# Patient Record
Sex: FEMALE | Race: WHITE | NOT HISPANIC OR LATINO | Employment: UNEMPLOYED | URBAN - NONMETROPOLITAN AREA
[De-identification: names, ages, dates, MRNs, and addresses within clinical notes are randomized per-mention and may not be internally consistent; named-entity substitution may affect disease eponyms.]

---

## 2020-06-15 ENCOUNTER — PROCEDURE VISIT (OUTPATIENT)
Dept: OTOLARYNGOLOGY | Facility: CLINIC | Age: 60
End: 2020-06-15

## 2020-06-15 ENCOUNTER — OFFICE VISIT (OUTPATIENT)
Dept: OTOLARYNGOLOGY | Facility: CLINIC | Age: 60
End: 2020-06-15

## 2020-06-15 VITALS
TEMPERATURE: 97 F | HEART RATE: 60 BPM | SYSTOLIC BLOOD PRESSURE: 141 MMHG | HEIGHT: 67 IN | DIASTOLIC BLOOD PRESSURE: 82 MMHG | WEIGHT: 170.4 LBS | BODY MASS INDEX: 26.74 KG/M2

## 2020-06-15 DIAGNOSIS — H90.3 SENSORINEURAL HEARING LOSS (SNHL) OF BOTH EARS: Primary | ICD-10-CM

## 2020-06-15 DIAGNOSIS — IMO0001 ASYMMETRICAL HEARING LOSS OF RIGHT EAR: Primary | ICD-10-CM

## 2020-06-15 DIAGNOSIS — R42 VERTIGO: ICD-10-CM

## 2020-06-15 DIAGNOSIS — H93.11 TINNITUS OF RIGHT EAR: ICD-10-CM

## 2020-06-15 PROCEDURE — 99203 OFFICE O/P NEW LOW 30 MIN: CPT | Performed by: PHYSICIAN ASSISTANT

## 2020-06-15 RX ORDER — LEVOTHYROXINE SODIUM 0.1 MG/1
100 TABLET ORAL DAILY
COMMUNITY
Start: 2020-03-28

## 2020-06-15 RX ORDER — PREDNISONE 10 MG/1
TABLET ORAL
Qty: 30 TABLET | Refills: 0 | Status: SHIPPED | OUTPATIENT
Start: 2020-06-15

## 2020-06-15 RX ORDER — RABEPRAZOLE SODIUM 20 MG/1
20 TABLET, DELAYED RELEASE ORAL DAILY
COMMUNITY
Start: 2017-04-11

## 2020-06-15 RX ORDER — ALENDRONATE SODIUM 70 MG/1
70 TABLET ORAL WEEKLY
COMMUNITY
Start: 2020-04-23

## 2020-06-15 NOTE — PATIENT INSTRUCTIONS
Will get MRI of IAC's to rule out other retrocochlear diseases, will treat with steroids as it could be an atypical meniere's flair up. Recheck in 6 months.    MENIERE’S DISEASE    Description:  Meniere’s disease is a disorder of the inner ear.  The disease appears to be related to a failure of the mechanism regulating the production, circulation, and/or absorption of endolymph.  The cause of Meniere’s disease is unknown.  Proposed theories of cochlear dysfunction in patients with Meniere’s disease include mechanical, metabolic, and/or biochemical alterations.  Clinically, the classic symptoms of Meniere’s disease are recurrent episodes of vertigo, fluctuating sensorineural hearing loss, and roaring tinnitus.    Histopathologic observations on temporal bones from individuals with Meniere’s disease have consistently shown distention of the endolymphatic system.  Dilatation of the membranous labyrinth may be accompanied by herniations, ruptures, fistulas, and/or total collapse.  The organ of Corti  appears normal in some specimens and abnormal in others (Kimura et al., 1976).  In general, morphologic changes in the organ of Corti have been difficult to ascribe exclusively to Meniere’s disease because similar changes may be found in non-Meniere’s specimens, such as individuals with presbyacusis (Kimura et al., 1976).    Patient Characteristics:  The initial onset of symptoms is between 40 and 60 years of age in about 50% of individuals.  The disease is rate in children.  The hearing loss is unilateral in approximately 80% of individuals.  A hereditary predisposition for Meniere’s disease may be indicated by occasional occurrences of episodic vertigo and hearing loss in families (Pratt, 1965).  A sex ration for Meniere’s disease is disputed.    Clinical Course:  The onset of Meniere’s disease is typically characterized by a sudden episode of vertigo accompanied by nausea and vomiting.  The onset of vertigo may be  preceded by feelings of pressure or fullness in the ear.  Briefly before or during the vertiginous episode, a loud roaring or ringing tinnitus may become apparent.  Hearing sensitivity in the affected ear may decrease.  However, information about hearing sensitivity during the vertiginous episode may be difficult to obtain because the symptoms of vertigo and loud, roaring tinnitus may be relatively more dramatic.    Following the acute episode, the vertigo may cease or subside into a sensation of unsteadiness.  Feelings of unsteadiness may last for a period of days.  The attack usually involves no neurologic symptoms other than those referable to the end organ.  At the end of the attack, hearing sensitivity may improve.  Tinnitus may lessen.    The initial attack of vertigo is characteristically followed by a period of remission.  During this time, the patient may notice fluctuation in his hearing ability, in the tinnitus, and in the feelings of pressure or fullness within the ear.  Subsequently, the above symptoms may be interrupted or exacerbated by another episode of spontaneous vertigo.  The characteristic frequency of vertiginous attacks is variable among patients.      Gradually, a longer period of spontaneous or treatment-induced remission occurs.  During remission, no symptoms may be noted other than a loss of hearing in the involved ear. The clinical course is subsequently characterized by active periods of variable length interspersed with remission periods of variable length.  Over time, the symptoms of vertigo, nausea, and vomiting tend to become less severe.  In contrast, the hearing loss and tinnitus may become progressively worse (Marissa and Arnulfo, 1976).    Treatment may involve drug therapy, diet modification, and/or surgery in patients with incapacitating and unsuccessfully treated vertiginous attacks.    Site of Disorder:      [place].    General Audiologic Pattern:  Pure tone sensitivity  results characteristically show a unilateral, fluctuating sensorineural hearing loss.  During the initial sates of Meniere’s disease, the degree of loss may vary.  However, over a period of years, the degree of loss usually progresses to a moderate to severe, permanent impairment.  On rare occasions, a total loss of hearing may occur.    The audiometric configuration may vary with the time course of the disease.  In the initial stages of Meniere’s disease, the audiometric contour may be rising, with greater loss in the low frequency region than in the mid and high frequency regions.   Subsequently, the audiometric configuration may change to a relatively flat contour with approximately the same degree of sensitivity loss at all frequencies.  During a later stage of Meniere’s disease, the audiogram may present a downwardly sloping configuration with greater loss in the high frequency region than in the low frequency region.    Maximum speech intelligibility scores are usually reduced in a manner consistent with the degree and configuration of sensitivity loss.    Impedance results characteristically show normal, type A, tympanograms and normal static compliance measures.  Acoustic reflexes are characteristically present at normal HLs and reduced sensation levels.  No reflex decay is observed at 500 Hz or 1000 Hz.    In some patients with Meniere’s disease, the loudness discomfort level or the intensity level where sounds are first reported as annoying may occur at a reduced hearing level relative to the normal ear.  Patients may report intolerance to loud sounds.  Consequently, auditory tests at loud intensity levels may not e obtainable.  In addition, patients may complain of diplacusis or a difference of pitch sensation in the involved ear relative to the normal ear.

## 2020-06-16 PROBLEM — H93.11 TINNITUS OF RIGHT EAR: Status: ACTIVE | Noted: 2020-06-16

## 2020-06-16 PROBLEM — R42 VERTIGO: Status: ACTIVE | Noted: 2020-06-16

## 2020-06-16 PROBLEM — IMO0001 ASYMMETRICAL HEARING LOSS OF RIGHT EAR: Status: ACTIVE | Noted: 2020-06-16

## 2020-06-25 ENCOUNTER — TELEPHONE (OUTPATIENT)
Dept: OTOLARYNGOLOGY | Facility: CLINIC | Age: 60
End: 2020-06-25

## 2020-06-25 NOTE — TELEPHONE ENCOUNTER
----- Message from OFELIA Jurdao sent at 6/25/2020  8:42 AM CDT -----  Please call the patient regarding her normal result.

## 2020-07-27 ENCOUNTER — OFFICE VISIT (OUTPATIENT)
Dept: OTOLARYNGOLOGY | Facility: CLINIC | Age: 60
End: 2020-07-27

## 2020-07-27 VITALS
HEART RATE: 66 BPM | RESPIRATION RATE: 16 BRPM | DIASTOLIC BLOOD PRESSURE: 87 MMHG | SYSTOLIC BLOOD PRESSURE: 133 MMHG | HEIGHT: 67 IN | BODY MASS INDEX: 26.21 KG/M2 | WEIGHT: 167 LBS | TEMPERATURE: 97.7 F

## 2020-07-27 DIAGNOSIS — IMO0001 ASYMMETRICAL HEARING LOSS OF RIGHT EAR: Primary | ICD-10-CM

## 2020-07-27 DIAGNOSIS — H81.01 MENIERE DISEASE, RIGHT: ICD-10-CM

## 2020-07-27 DIAGNOSIS — H93.11 TINNITUS OF RIGHT EAR: ICD-10-CM

## 2020-07-27 DIAGNOSIS — R42 VERTIGO: ICD-10-CM

## 2020-07-27 PROCEDURE — 99214 OFFICE O/P EST MOD 30 MIN: CPT | Performed by: PHYSICIAN ASSISTANT

## 2020-07-27 NOTE — PROGRESS NOTES
OFELIA Jurado     Chief Complaint   Patient presents with   • Ear Problem        HISTORY OF PRESENT ILLNESS:     Yesica Patel is a  60 y.o.  female who presents for follow-up evaluation of complaints of roaring tinnitus. The symptoms are localized to the right ear. The patient has had continued symptoms. The symptoms have been relatively constant for the last several months. The symptoms are aggravated by  no identifiable factors. The symptoms are improved by no identifiable factors.    The patient reports being evaluated at the [facility] in the past and was diagnosed with Meniere's disease. She reports that she has never had an MRI before of the head. The roaring came on suddenly in the right ear in February. The patient denies vertigo symptoms at this time and states she doesn't feel like her hearing has changed.                 Review of Systems   Constitutional: Negative for activity change, appetite change, chills, diaphoresis, fatigue, fever and unexpected weight change.   HENT: Positive for hearing loss and tinnitus. Negative for congestion, dental problem, drooling, ear discharge, ear pain, facial swelling, mouth sores, nosebleeds, postnasal drip, rhinorrhea, sinus pressure, sneezing, sore throat, trouble swallowing and voice change.    Eyes: Negative.    Respiratory: Negative.    Cardiovascular: Negative.    Gastrointestinal: Negative.    Endocrine: Negative.    Skin: Negative.    Allergic/Immunologic: Negative for environmental allergies, food allergies and immunocompromised state.   Neurological: Negative for dizziness.   Hematological: Negative.    Psychiatric/Behavioral: Negative.    :    Past History:  Past Medical History:   Diagnosis Date   • Breast cancer (CMS/HCC) 2010   • Disease of thyroid gland    • Osteoporosis      Past Surgical History:   Procedure Laterality Date   • BREAST SURGERY     • HYSTERECTOMY     • LAPAROSCOPIC TUBAL LIGATION       Family History   Problem Relation Age  of Onset   • Cancer Paternal Grandfather         Breast     Social History     Tobacco Use   • Smoking status: Never Smoker   • Smokeless tobacco: Never Used   Substance Use Topics   • Alcohol use: Never     Frequency: Never   • Drug use: Never     Outpatient Medications Marked as Taking for the 7/27/20 encounter (Office Visit) with Ino Hair PA   Medication Sig Dispense Refill   • alendronate (FOSAMAX) 70 MG tablet Take 70 mg by mouth 1 (One) Time Per Week.     • levothyroxine (SYNTHROID, LEVOTHROID) 100 MCG tablet Take 100 mcg by mouth Daily.     • RABEprazole (ACIPHEX) 20 MG EC tablet Take 20 mg by mouth Daily.       Allergies:  Patient has no known allergies.          Vital Signs:   Vitals:    07/27/20 1345   BP: 133/87   Pulse: 66   Resp: 16   Temp: 97.7 °F (36.5 °C)         EXAMINATION:   CONSTITUTIONAL: well nourished, alert, oriented, in no acute distress     COMMUNICATION AND VOICE: able to communicate normally, normal voice quality    HEAD: normocephalic, no lesions, atraumatic, no tenderness, no masses     FACE: appearance normal, no lesions, no tenderness, no deformities, facial motion symmetric    EYES: ocular motility normal, eyelids normal, orbits normal, no proptosis, conjunctiva normal , pupils equal, round     EARS:  Hearing: response to conversational voice normal bilaterally   External Ears: auricles without lesions    NOSE:  External Nose: structure normal, no tenderness on palpation, no nasal discharge, no lesions, no evidence of trauma, nostrils patent     ORAL:  Lips: upper and lower lips without lesion     NECK: neck appearance normal    CHEST/RESPIRATORY: respiratory effort normal, normal breath sounds     CARDIOVASCULAR: rate and rhythm normal, extremities without cyanosis or edema      NEUROLOGIC/PSYCHIATRIC: oriented to time, place and person, mood normal, affect appropriate, CN II-XII intact grossly    RESULTS REVIEW:    I have reviewed the patients old records in the  chart.  Old audiologic testing was reviewed.       Assessment    Diagnosis Plan   1. Asymmetrical hearing loss of right ear  Comprehensive Hearing Test   2. Tinnitus of right ear  Comprehensive Hearing Test   3. Vertigo  Comprehensive Hearing Test   4. Meniere disease, right  Comprehensive Hearing Test       Plan    Patient Instructions   Continue treatment as directed, would recommend low salt diet and avoiding processed foods. If symptoms flair-up advised to call for medrol pack, zofran, and robinul.     Would recommend considering a hearing aid evaluation.    Follow-up in one year with audiogram.    500 Milligrams or 22 Mill equivalent Sodium Diet    All foods on this diet should be cooked and served without the addition of salt or other sodium compounds.    This diet meets the 1989 Recommended Daily Dietary Allowance when the types of food and amounts suggested are included daily:    1. 2 or more cups of milk or substitute for adults  2. 2 or more servings of meat, fish, poultry, eggs, or cheese  3. 4 or more servings of bread and/or cereal (whole grain or  enriched)  4. 4 or more servings of vegetables and fruits (include citrus daily and a dark green or deep yellow vegetable every other day)    Foods  Allowed     Not allowed    Beverages Milk, 2 cups only, including that used in   Buttermilk, milk shakes, malted milk, commercial   cooking.  Whole, 2% skim, evaporated  eggnog. Instant cocoa mixes, carbonated beverages,    milk, reconstituted dried milk.  Other  prepared beverage mixes, including fruit flavoring    beverages as desired.  Cocoa, coffee  powder, [place] process cocoa, fountain beverages.    substitutes, decaffeinated coffee, tea,     some carbonated (consult your dietician.)    Bread  Low sodium break, unsalted crackers,  All bread or bakery goods prepared with baking    unsalted krysta toast.    powder, baking soda, or salt, salted crackers, pretzels,          self-rising flour, prepared mixes,  self-rising meal.    Cereals  Cooked cereal prepared without salt,  Cereals containing more than 25mg sodium.    dry or instant cereals containing 25mg of    sodium or less. (Ask dietician for list of    brand names).    Desserts  As desired. Fruits, gelatin desserts made  Blackstrap molasses.    with low sodium gelatin powder, unsalted    custard and pudding made from milk and    egg allowance, fruit plea made with unsalted    crust, marshmallows, hard candy, jelly beans.    For other desserts, ask dietitian for brand names.    Egg  One only, prepared any way without salt.    Fat  As desired. Unsalted butter, unsalted margarine, Salted butter, salted margarine, segal, pork, ham    hydrogenated fat, vegetable oil, low sodium  hock, salted commercial salad dressing or mayonnaise.    Dietetic salad dressing, cream (limit 2tbs.)        Fruit and All fresh, frozen or canned fruit unless listed  Canned, dried, or frozen fruit, which have salt or sodium  Fruit Juices under “Not Allowed”.    benzoate added, crystallized or glazed fruit, maraschino          cherries, tomato, V-8 or other vegetable juices unless low          sodium.                    Meat, Fish, 4 ounces only, prepared any way without  Canned, salted or smoked meat (segal, chipped or  Poultry,Cheese salt, beef, lamb, pork, veal, chicken, turkey,  corned beef, frankfurters, ham, kosher meats, luncheon    fresh or unsalted frozen fish, low sodium  meats, salt pork, sausage, smoked tongue, most frozen    tuna or salmon, unsalted cottage cheese,  fish,(anchovies, salt cod, herring, sardines, etc.) canned    5 tsp peanut butter may be substituted for  tuna or salmon unless low sodium, shellfish (clams,    one ounce of meat.    Crab, lobster, scallops, shrimp) kidney, cheese except          unsalted cottage cheese and low sodium cheese.    Potato or Prepared any way without salt. White or  Potato chips or other snack chips, instant potatoes,  Potato Sub sweet  potatoes, yams, macaroni, noodles,  convenience potatoes, convenience rice or noodle    spaghetti, rice     products    Soup  As desired. Salt-free broth or cream   Soup made with salt or sodium compounds, canned    soup made with milk allowance and  soup unless low bouillon cubes, dried soup.    allowed vegetable, low sodium canned    soup.    Vegetables Vegetables prepared any way without  Fresh, frozen or canned artichokes, beets, best greens,    salt.  All fresh frozen or low sodium  carrots, celery, hominy, kale, mustard greens, mixed    canned vegetables unless listed under  vegetables, parsley, sauerkraut, spinach, turnips (white).    “Not allowed”.     frozen lima beans, peas, dried split peas.  All regular          canned vegetables.  All tomato juice or vegetable juice.    Miscellaneous As desired.  All spices, basil, bay leaves,  Salt, baking soda, baking powder, celery seed, celery salt,    chili powder, cinnamon, cloves, cocoa,  onion salt, mixed seasoned salt, mono-sodium glutamate,    emerson, coconut, dill, garlic, garlic powder  catsup, chili sauce, barbecue sauce, prepared mustard, segal    uday, mace, dry mustard powder, oregano  bits, prepared horseradish, soy sauce, olives, [facility]    paprika, pepper, poultry seasoning, naldo,  sauce, steak sauce, Kitchen Bouquet, meat extracts, gravies,  thyme, turmeric, emanuel, poppy and sesame tenderizers, pickles, relish, salted nuts, cooking kiara     seeds, lemon juice, vinegar, flavoring extracts, containing salt, seasoned cooking mixes.    cream of tartar, yeast, unsalted nuts, unsalted    popcorn.       Orders Placed This Encounter   Procedures   • Comprehensive Hearing Test          Return in about 1 year (around 7/27/2021) for Recheck ears with audiogram.    OFELIA Jurado  07/27/20  14:01

## 2020-07-27 NOTE — PATIENT INSTRUCTIONS
Continue treatment as directed, would recommend low salt diet and avoiding processed foods. If symptoms flair-up advised to call for medrol pack, zofran, and robinul.     Would recommend considering a hearing aid evaluation.    Follow-up in one year with audiogram.    500 Milligrams or 22 Mill equivalent Sodium Diet    All foods on this diet should be cooked and served without the addition of salt or other sodium compounds.    This diet meets the 1989 Recommended Daily Dietary Allowance when the types of food and amounts suggested are included daily:    1. 2 or more cups of milk or substitute for adults  2. 2 or more servings of meat, fish, poultry, eggs, or cheese  3. 4 or more servings of bread and/or cereal (whole grain or  enriched)  4. 4 or more servings of vegetables and fruits (include citrus daily and a dark green or deep yellow vegetable every other day)    Foods  Allowed     Not allowed    Beverages Milk, 2 cups only, including that used in   Buttermilk, milk shakes, malted milk, commercial   cooking.  Whole, 2% skim, evaporated  eggnog. Instant cocoa mixes, carbonated beverages,    milk, reconstituted dried milk.  Other  prepared beverage mixes, including fruit flavoring    beverages as desired.  Cocoa, coffee  powder, [place] process cocoa, fountain beverages.    substitutes, decaffeinated coffee, tea,     some carbonated (consult your dietician.)    Bread  Low sodium break, unsalted crackers,  All bread or bakery goods prepared with baking    unsalted krysta toast.    powder, baking soda, or salt, salted crackers, pretzels,          self-rising flour, prepared mixes, self-rising meal.    Cereals  Cooked cereal prepared without salt,  Cereals containing more than 25mg sodium.    dry or instant cereals containing 25mg of    sodium or less. (Ask dietician for list of    brand names).    Desserts  As desired. Fruits, gelatin desserts made  Blackstrap molasses.    with low sodium gelatin powder,  unsalted    custard and pudding made from milk and    egg allowance, fruit plea made with unsalted    crust, marshmallows, hard candy, jelly beans.    For other desserts, ask dietitian for brand names.    Egg  One only, prepared any way without salt.    Fat  As desired. Unsalted butter, unsalted margarine, Salted butter, salted margarine, segal, pork, ham    hydrogenated fat, vegetable oil, low sodium  hock, salted commercial salad dressing or mayonnaise.    Dietetic salad dressing, cream (limit 2tbs.)        Fruit and All fresh, frozen or canned fruit unless listed  Canned, dried, or frozen fruit, which have salt or sodium  Fruit Juices under “Not Allowed”.    benzoate added, crystallized or glazed fruit, maraschino          cherries, tomato, V-8 or other vegetable juices unless low          sodium.                    Meat, Fish, 4 ounces only, prepared any way without  Canned, salted or smoked meat (segal, chipped or  Poultry,Cheese salt, beef, lamb, pork, veal, chicken, turkey,  corned beef, frankfurters, ham, kosher meats, luncheon    fresh or unsalted frozen fish, low sodium  meats, salt pork, sausage, smoked tongue, most frozen    tuna or salmon, unsalted cottage cheese,  fish,(anchovies, salt cod, herring, sardines, etc.) canned    5 tsp peanut butter may be substituted for  tuna or salmon unless low sodium, shellfish (clams,    one ounce of meat.    Crab, lobster, scallops, shrimp) kidney, cheese except          unsalted cottage cheese and low sodium cheese.    Potato or Prepared any way without salt. White or  Potato chips or other snack chips, instant potatoes,  Potato Sub sweet potatoes, yams, macaroni, noodles,  convenience potatoes, convenience rice or noodle    spaghetti, rice     products    Soup  As desired. Salt-free broth or cream   Soup made with salt or sodium compounds, canned    soup made with milk allowance and  soup unless low bouillon cubes, dried soup.    allowed vegetable, low sodium  canned    soup.    Vegetables Vegetables prepared any way without  Fresh, frozen or canned artichokes, beets, best greens,    salt.  All fresh frozen or low sodium  carrots, celery, hominy, kale, mustard greens, mixed    canned vegetables unless listed under  vegetables, parsley, sauerkraut, spinach, turnips (white).    “Not allowed”.     frozen lima beans, peas, dried split peas.  All regular          canned vegetables.  All tomato juice or vegetable juice.    Miscellaneous As desired.  All spices, basil, bay leaves,  Salt, baking soda, baking powder, celery seed, celery salt,    chili powder, cinnamon, cloves, cocoa,  onion salt, mixed seasoned salt, mono-sodium glutamate,    emerson, coconut, dill, garlic, garlic powder  catsup, chili sauce, barbecue sauce, prepared mustard, segal    uday, mace, dry mustard powder, oregano  bits, prepared horseradish, soy sauce, olives, [place]shire    paprika, pepper, poultry seasoning, naldo,  sauce, steak sauce, Kitchen Bouquet, meat extracts, gravies,  thyme, turmeric, emanuel, poppy and sesame tenderizers, pickles, relish, salted nuts, cooking kiara     seeds, lemon juice, vinegar, flavoring extracts, containing salt, seasoned cooking mixes.    cream of tartar, yeast, unsalted nuts, unsalted    popcorn.